# Patient Record
Sex: MALE | Race: WHITE | NOT HISPANIC OR LATINO | Employment: STUDENT | URBAN - METROPOLITAN AREA
[De-identification: names, ages, dates, MRNs, and addresses within clinical notes are randomized per-mention and may not be internally consistent; named-entity substitution may affect disease eponyms.]

---

## 2025-01-23 ENCOUNTER — ANCILLARY PROCEDURE (OUTPATIENT)
Dept: URGENT CARE | Age: 20
End: 2025-01-23
Payer: COMMERCIAL

## 2025-01-23 ENCOUNTER — OFFICE VISIT (OUTPATIENT)
Dept: URGENT CARE | Age: 20
End: 2025-01-23
Payer: COMMERCIAL

## 2025-01-23 VITALS
OXYGEN SATURATION: 97 % | WEIGHT: 160 LBS | SYSTOLIC BLOOD PRESSURE: 97 MMHG | DIASTOLIC BLOOD PRESSURE: 60 MMHG | RESPIRATION RATE: 16 BRPM | TEMPERATURE: 97.8 F | HEART RATE: 58 BPM

## 2025-01-23 DIAGNOSIS — S61.112A LACERATION OF LEFT THUMB WITHOUT FOREIGN BODY WITH DAMAGE TO NAIL, INITIAL ENCOUNTER: Primary | ICD-10-CM

## 2025-01-23 DIAGNOSIS — S61.112A LACERATION OF LEFT THUMB WITHOUT FOREIGN BODY WITH DAMAGE TO NAIL, INITIAL ENCOUNTER: ICD-10-CM

## 2025-01-23 PROCEDURE — 73140 X-RAY EXAM OF FINGER(S): CPT | Mod: LEFT SIDE

## 2025-01-23 RX ORDER — IBUPROFEN 800 MG/1
800 TABLET ORAL ONCE
Status: COMPLETED | OUTPATIENT
Start: 2025-01-23 | End: 2025-01-23

## 2025-01-23 RX ADMIN — IBUPROFEN 800 MG: 800 TABLET ORAL at 17:32

## 2025-01-23 ASSESSMENT — ENCOUNTER SYMPTOMS
MYALGIAS: 0
HEADACHES: 0
FEVER: 0
JOINT SWELLING: 0
LIGHT-HEADEDNESS: 0
DIZZINESS: 0
WOUND: 1
COLOR CHANGE: 0
CHILLS: 0

## 2025-01-23 NOTE — PATIENT INSTRUCTIONS
Return to clinic in 7-10 days for suture removal..   Keep clean and dry.   Wash with warm soap and water. Keep covered when active.     May take ibuprofen and tylenol as directed on bottle for pain.    Return to clinic or go to ED for any worsening of symptoms.

## 2025-01-23 NOTE — PROGRESS NOTES
Subjective   Patient ID: Villa Tillman is a 19 y.o. male. They present today with a chief complaint of left hand thumb cut.    History of Present Illness  19 year old male presents with complaint of laceration to right index finger and left thumb. Reports he was putting in ruthann in his home and attempting to cut the transition piece with a boxcutter/knife and sliced his hand. Incident occurred 45min prior to his arrival to clinic. Last Tdap 1 year ago at [facility] in [place]. Washed with warm water after and wrapped with tape to control bleeding. Rates pain in right index finger 0/10 and left thumb 6/10. Denies uncontrolled bleeding, dizziness, SOB, radiating pain, redness, warmth, fever, chills, bodyaches, numbness, tingling.  Pt right hand dominant.           Past Medical History  Allergies as of 01/23/2025    (No Known Allergies)       (Not in a hospital admission)       Past Medical History:   Diagnosis Date    Encounter for supervision of normal pregnancy, unspecified, unspecified trimester     Normal pregnancy    Personal history of other specified conditions 07/31/2014    History of abdominal pain       History reviewed. No pertinent surgical history.     reports that he has been smoking cigarettes. He does not have any smokeless tobacco history on file. He reports that he does not use drugs.    Review of Systems  Review of Systems   Constitutional:  Negative for chills and fever.   Cardiovascular:  Negative for chest pain.   Musculoskeletal:  Negative for joint swelling and myalgias.   Skin:  Positive for wound. Negative for color change.   Neurological:  Negative for dizziness, light-headedness and headaches.   All other systems reviewed and are negative.      Objective    Vitals:    01/23/25 1558   BP: 97/60   Pulse: 58   Resp: 16   Temp: 36.6 °C (97.8 °F)   SpO2: 97%   Weight: 72.6 kg (160 lb)     No LMP for male patient.    Physical Exam  Constitutional:       General: He is not in acute distress.      Appearance: Normal appearance. He is not ill-appearing.   HENT:      Mouth/Throat:      Mouth: Mucous membranes are moist.   Eyes:      Pupils: Pupils are equal, round, and reactive to light.   Cardiovascular:      Rate and Rhythm: Normal rate and regular rhythm.      Pulses: Normal pulses.      Heart sounds: Normal heart sounds. No murmur heard.  Pulmonary:      Effort: Pulmonary effort is normal. No respiratory distress.      Breath sounds: Normal breath sounds.   Musculoskeletal:         General: Normal range of motion.      Right hand: Laceration present.      Left hand: Laceration present.        Hands:       Cervical back: Normal range of motion and neck supple.   Skin:     General: Skin is warm and dry.      Findings: Signs of injury (0.5 cm superficial laceration to PIP of R index finger. 4 cm laceration to distal end of left thumb with damage to nailbed) present.   Neurological:      Mental Status: He is alert and oriented to person, place, and time.   Psychiatric:         Mood and Affect: Mood normal.         Behavior: Behavior normal.       Digital Block    Date/Time: 1/23/2025 4:35 PM    Performed by: THONY Templeton  Authorized by: THONY Templeton    Consent:     Consent obtained:  Verbal    Consent given by:  Patient    Risks, benefits, and alternatives were discussed: yes      Risks discussed:  Allergic reaction, nerve damage, swelling, unsuccessful block, intravascular injection and bleeding  Universal protocol:     Procedure explained and questions answered to patient or proxy's satisfaction: yes      Relevant documents present and verified: yes      Test results available: yes      Imaging studies available: yes      Site/side marked: yes      Immediately prior to procedure, a time out was called: yes      Patient identity confirmed:  Verbally with patient  Indications:     Indications:  Procedural anesthesia  Location:     Block location:  Finger    Finger blocked:  L  thumb  Pre-procedure details:     Neurovascular status: intact      Skin preparation:  Povidone-iodine  Procedure details:     Syringe type:  Luer lock syringe    Needle gauge:  27 G    Anesthetic injected:  Lidocaine 1% w/o epi    Technique:  Three-sided block    Injection procedure:  Negative aspiration for blood and anatomic landmarks palpated  Post-procedure details:     Outcome:  Anesthesia achieved    Procedure completion:  Tolerated  Laceration Repair    Date/Time: 1/23/2025 5:31 PM    Performed by: THONY Templeton  Authorized by: THONY Templeton    Consent:     Consent obtained:  Verbal    Consent given by:  Patient    Risks, benefits, and alternatives were discussed: yes      Risks discussed:  Infection, poor cosmetic result, pain, retained foreign body, nerve damage, need for additional repair, poor wound healing, tendon damage and vascular damage    Alternatives discussed:  No treatment  Universal protocol:     Procedure explained and questions answered to patient or proxy's satisfaction: yes      Relevant documents present and verified: yes      Imaging studies available: yes      Site/side marked: yes      Immediately prior to procedure, a time out was called: yes      Patient identity confirmed:  Verbally with patient  Anesthesia:     Anesthesia method:  Nerve block  Exploration:     Limited defect created (wound extended): no      Hemostasis achieved with:  Direct pressure    Imaging obtained: x-ray      Imaging outcome: foreign body not noted      Wound exploration: wound explored through full range of motion and entire depth of wound visualized      Wound extent: no foreign bodies/material noted, no muscle damage noted, no nerve damage noted, no underlying fracture noted and no vascular damage noted    Treatment:     Area cleansed with:  Chlorhexidine and soap and water    Amount of cleaning:  Standard    Irrigation solution:  Tap water    Irrigation method:  Tap    Visualized  foreign bodies/material removed: no      Debridement:  None    Undermining:  None    Scar revision: no    Skin repair:     Repair method:  Sutures    Suture size:  4-0    Suture material:  Nylon    Suture technique:  Simple interrupted    Number of sutures:  3  Approximation:     Approximation:  Close  Repair type:     Repair type:  Simple  Post-procedure details:     Dressing:  Tube gauze    Procedure completion:  Tolerated  Comments:      Nail cut back and then the lower 2/3 glued with derma-bond       Point of Care Test & Imaging Results from this visit  No results found for this visit on 01/23/25.   No results found.    Diagnostic study results (if any) were reviewed by THONY Templeton.    Assessment/Plan   Allergies, medications, history, and pertinent labs/EKGs/Imaging reviewed by THONY Templeton.     Medical Decision Making  Laceration of left thumb, in office able to be closed with skin sutures - No evidence of retained FB as well as any vascular/nerve/tendon/osseous injury. Superficial laceration to R index finger was cleaned and bandaged with Band-Aid. Prophylactic antibiotics are unwarranted at this time. Pt is counseled on local wound care. Patient advised to follow up for suture removal or otherwise return to clinic if any new signs or symptoms develop. Otherwise follow with PCP. Pt verbalized understanding and agreeable to plan of care.     Orders and Diagnoses  Diagnoses and all orders for this visit:  Laceration of left thumb without foreign body with damage to nail, initial encounter      Medical Admin Record      Patient disposition: Home    Electronically signed by THONY Templeton  4:16 PM